# Patient Record
Sex: FEMALE | Race: BLACK OR AFRICAN AMERICAN | NOT HISPANIC OR LATINO | URBAN - METROPOLITAN AREA
[De-identification: names, ages, dates, MRNs, and addresses within clinical notes are randomized per-mention and may not be internally consistent; named-entity substitution may affect disease eponyms.]

---

## 2025-02-28 ENCOUNTER — OFFICE VISIT (OUTPATIENT)
Dept: URGENT CARE | Age: 55
End: 2025-02-28
Payer: COMMERCIAL

## 2025-02-28 VITALS
OXYGEN SATURATION: 98 % | SYSTOLIC BLOOD PRESSURE: 158 MMHG | HEART RATE: 80 BPM | DIASTOLIC BLOOD PRESSURE: 91 MMHG | WEIGHT: 183.8 LBS | BODY MASS INDEX: 34.73 KG/M2

## 2025-02-28 DIAGNOSIS — J18.9 WALKING PNEUMONIA: ICD-10-CM

## 2025-02-28 DIAGNOSIS — J45.20 MILD INTERMITTENT ASTHMA, UNSPECIFIED WHETHER COMPLICATED (HHS-HCC): ICD-10-CM

## 2025-02-28 DIAGNOSIS — J10.1 INFLUENZA A: Primary | ICD-10-CM

## 2025-02-28 RX ORDER — NEBULIZER AND COMPRESSOR
EACH MISCELLANEOUS
Qty: 1 EACH | Refills: 0 | Status: SHIPPED | OUTPATIENT
Start: 2025-02-28

## 2025-02-28 RX ORDER — AZITHROMYCIN 250 MG/1
TABLET, FILM COATED ORAL
Qty: 6 TABLET | Refills: 0 | Status: SHIPPED | OUTPATIENT
Start: 2025-02-28 | End: 2025-03-05

## 2025-02-28 RX ORDER — METHYLPREDNISOLONE 4 MG/1
TABLET ORAL
Qty: 21 TABLET | Refills: 0 | Status: SHIPPED | OUTPATIENT
Start: 2025-02-28 | End: 2025-03-06

## 2025-02-28 RX ORDER — IPRATROPIUM BROMIDE AND ALBUTEROL SULFATE 2.5; .5 MG/3ML; MG/3ML
3 SOLUTION RESPIRATORY (INHALATION) ONCE
Status: COMPLETED | OUTPATIENT
Start: 2025-02-28 | End: 2025-02-28

## 2025-02-28 RX ORDER — ALBUTEROL SULFATE 0.63 MG/3ML
0.63 SOLUTION RESPIRATORY (INHALATION) EVERY 6 HOURS PRN
Qty: 75 ML | Refills: 0 | Status: SHIPPED | OUTPATIENT
Start: 2025-02-28 | End: 2026-02-28

## 2025-02-28 RX ADMIN — IPRATROPIUM BROMIDE AND ALBUTEROL SULFATE 3 ML: 2.5; .5 SOLUTION RESPIRATORY (INHALATION) at 16:43

## 2025-02-28 ASSESSMENT — ENCOUNTER SYMPTOMS
FACIAL SWELLING: 0
MYALGIAS: 0
EYE PAIN: 0
FATIGUE: 1
ACTIVITY CHANGE: 0
ABDOMINAL PAIN: 0
SORE THROAT: 1
CHILLS: 0
DIZZINESS: 0
SHORTNESS OF BREATH: 0
COUGH: 1
SINUS PAIN: 0
EYE DISCHARGE: 0
VOICE CHANGE: 0
RHINORRHEA: 1
FEVER: 0
CHEST TIGHTNESS: 0
WHEEZING: 0
SINUS PRESSURE: 0
TROUBLE SWALLOWING: 0
APPETITE CHANGE: 0

## 2025-02-28 NOTE — PROGRESS NOTES
Subjective   Patient ID: Coby Patrick is a 54 y.o. female. They present today with a chief complaint of Illness (Sob (flu A +) 2/18/2025 ).    History of Present Illness    History provided by:  Patient  Illness  Associated symptoms: congestion, cough, fatigue, rhinorrhea and sore throat    Associated symptoms: no abdominal pain, no chest pain, no ear pain, no fever, no myalgias, no shortness of breath and no wheezing    Patient has had increased cough and chest tightness, using albuterol without improvement. Admits to increasing chills.       Patient was in the the ED on 2/15 and 2/18/25. Diagnosed with flu a and dehydrationed on 2/18. CXR negative.   ED note:  54-year-old female presents for feeling ill since about 3 days ago. She describes headache, body aches which are the most significant complaints. She also has noticed a very mild cough and sore throat, has had several bouts of emesis yesterday and at least 1 today, no diarrhea. She was unaware she had a fever. On exam she appears uncomfortable, moaning with pain. Oropharynx is moist, neck supple, no lymphadenopathy. Heart is tachycardic but regular, lungs clear without rales or rhonchi, abdomen nontender, good distal pulses without edema, no focal neurologic deficits noted.  Workup with labs shows urinalysis without evidence of UTI, fairly normal renal function, electrolytes reveal hypokalemia and hypomagnesemia, both were replaced. Lipase is normal, no leukocytosis, no significant anemia, normal platelet count. BNP is normal, total CK is normal. Patient is positive for influenza A. COVID, influenza B and RSV are negative.  Radiology reads the chest x-ray to show no acute abnormality.  Patient's initial tachycardia improved markedly, good response to antipyretics, no oxygen requirements during my evaluation, oxygen saturations were normal on RA.  Patient was treated with IV fluid, Toradol, Tylenol, magnesium and potassium. Patient is feeling better.  Ultimately she was able to sleep and does feel comfortable discharge to home.       Past Medical History  Allergies as of 2025    (No Known Allergies)       (Not in a hospital admission)       Past Medical History:   Diagnosis Date    Personal history of other diseases of the circulatory system 2014    History of hypertension       Past Surgical History:   Procedure Laterality Date    CERVICAL FUSION  2014    Cervical Vertebral Fusion     SECTION, CLASSIC  2014     Section            Review of Systems  Review of Systems   Constitutional:  Positive for fatigue. Negative for activity change, appetite change, chills and fever.   HENT:  Positive for congestion, postnasal drip, rhinorrhea and sore throat. Negative for ear pain, facial swelling, mouth sores, sinus pressure, sinus pain, trouble swallowing and voice change.    Eyes:  Negative for pain and discharge.   Respiratory:  Positive for cough. Negative for chest tightness, shortness of breath and wheezing.    Cardiovascular:  Negative for chest pain.   Gastrointestinal:  Negative for abdominal pain.   Musculoskeletal:  Negative for myalgias.   Neurological:  Negative for dizziness and syncope.                                  Objective    Vitals:    25 1609   BP: (!) 158/91   Pulse: 80   SpO2: 98%   Weight: 83.4 kg (183 lb 12.8 oz)     No LMP recorded.    Physical Exam  Vitals reviewed.   Constitutional:       General: She is not in acute distress.     Appearance: Normal appearance.   HENT:      Right Ear: Tympanic membrane normal.      Left Ear: Tympanic membrane normal.      Nose: Congestion and rhinorrhea present.      Mouth/Throat:      Pharynx: No oropharyngeal exudate or posterior oropharyngeal erythema.   Eyes:      Conjunctiva/sclera: Conjunctivae normal.   Cardiovascular:      Rate and Rhythm: Normal rate and regular rhythm.   Pulmonary:      Effort: Pulmonary effort is normal.      Breath sounds: Decreased  breath sounds present.      Comments: Improved airflow after nebulizer.   Skin:     General: Skin is warm and dry.   Neurological:      General: No focal deficit present.      Mental Status: She is alert.         Procedures    Point of Care Test & Imaging Results from this visit  No results found for this visit on 02/28/25.   No results found.    Diagnostic study results (if any) were reviewed by VICKIE Connell.    Assessment/Plan   Allergies, medications, history, and pertinent labs/EKGs/Imaging reviewed by VICKIE Connell.     Medical Decision Making  MDM- Patient presents with signs and symptoms consistent with suspected walking pneumonia. CXR if not improved in the next week recommended. No evidence of sepsis or acute respiratory distress at this time. Will treat with appropriate antibiotics for age group/risk factors and current mycoplasma outbreak in area. Patient is advised if symptoms change or worsen go to ED for further evaluation and care. Otherwise follow-up with family doctor for recheck within 5-7 days. Patient verbalized understanding and agrees with plan.      Orders and Diagnoses  Diagnoses and all orders for this visit:  Influenza A  -     ipratropium-albuteroL (Duo-Neb) 0.5-2.5 mg/3 mL nebulizer solution 3 mL  Walking pneumonia  -     azithromycin (Zithromax) 250 mg tablet; Take 2 tabs (500 mg) by mouth today, then take 1 tab daily for 4 days.  -     methylPREDNISolone (Medrol Dospak) 4 mg tablets; Follow schedule on package instructions  Mild intermittent asthma, unspecified whether complicated ([facility]HS-HCC)  -     nebulizer accessories misc; 1 tubing set- mask or other, daily as needed.  -     nebulizer and compressor device; 1 machine daily as needed.  -     albuterol 0.63 mg/3 mL nebulizer solution; Take 3 mL (0.63 mg) by nebulization every 6 hours if needed for wheezing.      Medical Admin Record  Administrations This Visit       ipratropium-albuteroL (Duo-Neb)  0.5-2.5 mg/3 mL nebulizer solution 3 mL       Admin Date  02/28/2025 Action  Given Dose  3 mL Route  nebulization Documented By  Jayjay Loomis MA                    Patient disposition: Home    Electronically signed by VICKIE Connell  4:50 PM

## 2025-03-05 ENCOUNTER — TELEPHONE (OUTPATIENT)
Dept: URGENT CARE | Age: 55
End: 2025-03-05

## 2025-03-05 NOTE — TELEPHONE ENCOUNTER
Pt was seen here on Friday 2/28. She needs her prescription was sent to [facility] bit they do not have the stuff and it need to be resent to [facility] and would like a call back.     Coby Patrick  1970  [phone]